# Patient Record
Sex: MALE | Race: WHITE | Employment: UNEMPLOYED | URBAN - METROPOLITAN AREA
[De-identification: names, ages, dates, MRNs, and addresses within clinical notes are randomized per-mention and may not be internally consistent; named-entity substitution may affect disease eponyms.]

---

## 2018-11-13 ENCOUNTER — TELEPHONE (OUTPATIENT)
Dept: FAMILY MEDICINE | Facility: CLINIC | Age: 23
End: 2018-11-13

## 2018-11-13 NOTE — TELEPHONE ENCOUNTER
Mom called again, needs this asap.,  Needs to seen or a referral.  Pt now is in [place],     [phone]    Please call jas hawk Mom

## 2018-11-13 NOTE — TELEPHONE ENCOUNTER
Mom is calling stating patient had 2 seizures while he has been in [place]. He was seen in the emergency room at [facility] in [place], [place] but released before seeing a neurologist. He still has mom's insurance and cannot find a neurologist that takes his insurance in [place]. They are looking for suggestions on what they can do in the meantime. They were given medication but are concerned as he cannot go back to work without being cleared.     Mom would like to know what Dr. Guerrero suggests. States Dr. Guerrero knows her very well and would like to know what she might suggest for her to try.    Gabbie Donal[place]

## 2018-11-13 NOTE — TELEPHONE ENCOUNTER
Talked to mom for over 20 minutes regarding this issue I advised her to contact her insurance co re PCP options for her son in [place] and then that provider can manage the referral to neurology